# Patient Record
Sex: FEMALE
[De-identification: names, ages, dates, MRNs, and addresses within clinical notes are randomized per-mention and may not be internally consistent; named-entity substitution may affect disease eponyms.]

---

## 2019-11-14 ENCOUNTER — HOSPITAL ENCOUNTER (OUTPATIENT)
Dept: HOSPITAL 63 - PMG | Age: 64
Discharge: HOME | End: 2019-11-14
Attending: NURSE PRACTITIONER
Payer: COMMERCIAL

## 2019-11-14 DIAGNOSIS — J92.9: Primary | ICD-10-CM

## 2019-11-14 PROCEDURE — 71046 X-RAY EXAM CHEST 2 VIEWS: CPT

## 2019-11-15 NOTE — RAD
CHEST PA   LATERAL

 

History: Chest pain 

 

Comparison: 9/16/2013 two-view chest exam.

 

Findings: 

Frontal and lateral views of chest were obtained. Left apical pleural 

thickening noted. The cardiomediastinal silhouette is normal. Pulmonary 

vasculature is normal. The lungs are clear. No pleural effusion or 

pneumothorax is seen. There is no acute bone abnormality. 

 

IMPRESSION: 

No acute cardiopulmonary process. 

 

 

Electronically signed by: Willie Cedillo MD (11/15/2019 8:33 AM) [facility]

## 2022-04-19 ENCOUNTER — HOSPITAL ENCOUNTER (OUTPATIENT)
Dept: HOSPITAL 63 - RAD | Age: 67
End: 2022-04-19
Payer: MEDICARE

## 2022-04-19 DIAGNOSIS — R05.9: Primary | ICD-10-CM

## 2022-04-19 PROCEDURE — 71046 X-RAY EXAM CHEST 2 VIEWS: CPT

## 2022-04-19 NOTE — RAD
EXAM:  XR CHEST 2V 4/19/2022 1:22 PM



CLINICAL INDICATION:  Persistent cough



COMPARISON:  Chest radiograph 11/14/2019



TECHNIQUE:  PA and lateral views of the chest



FINDINGS:  The heart and mediastinum are normal.  Lungs are well-expanded. There are possible opaciti
es in the left lung base. The lungs are otherwise clear. Pulmonary vascularity is normal.  The thorac
ic skeleton is intact.



IMPRESSION: Possible very subtle opacities in the left lung base, which could reflect pneumonia. Dean
mmend follow-up radiograph to ensure resolution.



Electronically signed by: Lashell Ji MD (4/19/2022 4:59 PM) [id number]